# Patient Record
Sex: FEMALE | Race: BLACK OR AFRICAN AMERICAN | NOT HISPANIC OR LATINO | URBAN - METROPOLITAN AREA
[De-identification: names, ages, dates, MRNs, and addresses within clinical notes are randomized per-mention and may not be internally consistent; named-entity substitution may affect disease eponyms.]

---

## 2023-06-08 ENCOUNTER — EMERGENCY (EMERGENCY)
Facility: HOSPITAL | Age: 18
LOS: 1 days | Discharge: HOME | End: 2023-06-08
Attending: EMERGENCY MEDICINE | Admitting: EMERGENCY MEDICINE
Payer: MEDICAID

## 2023-06-08 VITALS
SYSTOLIC BLOOD PRESSURE: 111 MMHG | WEIGHT: 132.28 LBS | OXYGEN SATURATION: 99 % | RESPIRATION RATE: 16 BRPM | TEMPERATURE: 98 F | HEART RATE: 110 BPM | HEIGHT: 64 IN | DIASTOLIC BLOOD PRESSURE: 81 MMHG

## 2023-06-08 PROCEDURE — 73630 X-RAY EXAM OF FOOT: CPT | Mod: 26,RT

## 2023-06-08 PROCEDURE — 99284 EMERGENCY DEPT VISIT MOD MDM: CPT

## 2023-06-08 RX ORDER — IBUPROFEN 200 MG
1 TABLET ORAL
Qty: 20 | Refills: 0
Start: 2023-06-08

## 2023-06-08 RX ORDER — IBUPROFEN 200 MG
400 TABLET ORAL ONCE
Refills: 0 | Status: COMPLETED | OUTPATIENT
Start: 2023-06-08 | End: 2023-06-08

## 2023-06-08 RX ORDER — CEPHALEXIN 500 MG
1 CAPSULE ORAL
Qty: 28 | Refills: 0
Start: 2023-06-08 | End: 2023-06-14

## 2023-06-08 RX ADMIN — Medication 400 MILLIGRAM(S): at 18:01

## 2023-06-08 NOTE — ED PROVIDER NOTE - PATIENT PORTAL LINK FT
You can access the FollowMyHealth Patient Portal offered by [facility] by registering at the following website: [email or web address]. By joining [facility]’s FollowMyHealth portal, you will also be able to view your health information using other applications (apps) compatible with our system.

## 2023-06-08 NOTE — ED PROVIDER NOTE - CLINICAL SUMMARY MEDICAL DECISION MAKING FREE TEXT BOX
18 yo F with atraumatic foot pain and questionable very early cellulitis  Plan - xray, pain meds, dc with abx which pt will  if redness progresses/worsens

## 2023-06-08 NOTE — ED PROVIDER NOTE - OBJECTIVE STATEMENT
16 yo F denies pmh  c/o R lateral foot pain x 2 days, worse with palpation and movement, no treatment pta  Denies fever, trauma, other acute complaints

## 2023-06-08 NOTE — ED PROVIDER NOTE - PHYSICAL EXAMINATION
GENERAL: well appearing, no acute distress   HEAD: atraumatic   EYES: EOMI   ENT: moist oral mucosa   CARDIAC: regular rate  RESPIRATORY: no increased work of breathing   MUSCULOSKELETAL: base of R 5th metatarsal with ttp, mild swelling, small area overlying redness   NEUROLOGICAL: alert, spontaneous movement of extremities   SKIN: no visible rash  PSYCHIATRIC: cooperative

## 2023-06-08 NOTE — ED ADULT NURSE NOTE - NSFALLUNIVINTERV_ED_ALL_ED
Bed/Stretcher in lowest position, wheels locked, appropriate side rails in place/Call bell, personal items and telephone in reach/Instruct patient to call for assistance before getting out of bed/chair/stretcher/Non-slip footwear applied when patient is off stretcher/[place] to call system/Physically safe environment - no spills, clutter or unnecessary equipment/Purposeful proactive rounding/Room/bathroom lighting operational, light cord in reach

## 2023-06-10 DIAGNOSIS — M79.671 PAIN IN RIGHT FOOT: ICD-10-CM

## 2023-10-06 ENCOUNTER — EMERGENCY (EMERGENCY)
Facility: HOSPITAL | Age: 18
LOS: 1 days | Discharge: HOME | End: 2023-10-06
Attending: EMERGENCY MEDICINE | Admitting: EMERGENCY MEDICINE
Payer: COMMERCIAL

## 2023-10-06 VITALS
DIASTOLIC BLOOD PRESSURE: 67 MMHG | SYSTOLIC BLOOD PRESSURE: 101 MMHG | WEIGHT: 131.84 LBS | OXYGEN SATURATION: 97 % | HEART RATE: 100 BPM | HEIGHT: 64 IN | RESPIRATION RATE: 18 BRPM | TEMPERATURE: 99 F

## 2023-10-06 PROCEDURE — 99284 EMERGENCY DEPT VISIT MOD MDM: CPT

## 2023-10-06 RX ORDER — IBUPROFEN 200 MG
600 TABLET ORAL ONCE
Refills: 0 | Status: COMPLETED | OUTPATIENT
Start: 2023-10-06 | End: 2023-10-06

## 2023-10-06 RX ORDER — IBUPROFEN 200 MG
1 TABLET ORAL
Qty: 21 | Refills: 0
Start: 2023-10-06 | End: 2023-10-12

## 2023-10-06 RX ORDER — ACETAMINOPHEN 500 MG
650 TABLET ORAL ONCE
Refills: 0 | Status: COMPLETED | OUTPATIENT
Start: 2023-10-06 | End: 2023-10-06

## 2023-10-06 RX ORDER — ACETAMINOPHEN 500 MG
2 TABLET ORAL
Qty: 42 | Refills: 0
Start: 2023-10-06 | End: 2023-10-12

## 2023-10-06 RX ADMIN — Medication 650 MILLIGRAM(S): at 18:07

## 2023-10-06 RX ADMIN — Medication 600 MILLIGRAM(S): at 18:07

## 2023-10-06 NOTE — ED PROVIDER NOTE - PATIENT PORTAL LINK FT
You can access the FollowMyHealth Patient Portal offered by [facility] by registering at the following website: [email or web address]. By joining [facility]’s FollowMyHealth portal, you will also be able to view your health information using other applications (apps) compatible with our system.
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
## 2023-10-06 NOTE — ED PROVIDER NOTE - NSFOLLOWUPINSTRUCTIONS_ED_ALL_ED_FT
Take 2 500mg tylenol every 6-8 hours as needed for pain  Take 2-3 200mg Ibuprofen every 6-8 hours as needed for pain

## 2023-10-10 DIAGNOSIS — S70.12XA CONTUSION OF LEFT THIGH, INITIAL ENCOUNTER: ICD-10-CM
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
## 2023-10-10 DIAGNOSIS — Z91.013 ALLERGY TO SEAFOOD: ICD-10-CM
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
## 2023-10-10 DIAGNOSIS — M79.605 PAIN IN LEFT LEG: ICD-10-CM
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
## 2024-07-05 ENCOUNTER — EMERGENCY (EMERGENCY)
Facility: HOSPITAL | Age: 19
LOS: 1 days | Discharge: HOME | End: 2024-07-05
Attending: EMERGENCY MEDICINE | Admitting: EMERGENCY MEDICINE
Payer: MEDICAID

## 2024-07-05 VITALS
DIASTOLIC BLOOD PRESSURE: 71 MMHG | RESPIRATION RATE: 16 BRPM | WEIGHT: 134.92 LBS | TEMPERATURE: 98 F | OXYGEN SATURATION: 100 % | HEIGHT: 66 IN | HEART RATE: 90 BPM | SYSTOLIC BLOOD PRESSURE: 123 MMHG

## 2024-07-05 VITALS
DIASTOLIC BLOOD PRESSURE: 75 MMHG | HEART RATE: 86 BPM | OXYGEN SATURATION: 100 % | SYSTOLIC BLOOD PRESSURE: 116 MMHG | RESPIRATION RATE: 16 BRPM | TEMPERATURE: 98 F

## 2024-07-05 LAB — HCG UR QL: POSITIVE — SIGNIFICANT CHANGE UP

## 2024-07-05 PROCEDURE — 99284 EMERGENCY DEPT VISIT MOD MDM: CPT

## 2024-07-05 PROCEDURE — 73120 X-RAY EXAM OF HAND: CPT | Mod: 26,LT

## 2024-07-05 RX ORDER — NAPROXEN SODIUM 550 MG
1 TABLET ORAL
Qty: 14 | Refills: 0
Start: 2024-07-05 | End: 2024-07-11

## 2024-07-05 RX ORDER — AMOXICILLIN/POTASSIUM CLAV 250-125 MG
1 TABLET ORAL ONCE
Refills: 0 | Status: COMPLETED | OUTPATIENT
Start: 2024-07-05 | End: 2024-07-05

## 2024-07-05 RX ORDER — ACETAMINOPHEN 325 MG
2 TABLET ORAL
Qty: 50 | Refills: 0
Start: 2024-07-05 | End: 2024-07-11

## 2024-07-05 RX ORDER — TETANUS TOXOID, REDUCED DIPHTHERIA TOXOID AND ACELLULAR PERTUSSIS VACCINE, ADSORBED 5; 2.5; 8; 8; 2.5 [IU]/.5ML; [IU]/.5ML; UG/.5ML; UG/.5ML; UG/.5ML
0.5 SUSPENSION INTRAMUSCULAR ONCE
Refills: 0 | Status: COMPLETED | OUTPATIENT
Start: 2024-07-05 | End: 2024-07-05

## 2024-07-05 RX ORDER — BACITRACIN 500 UNIT/G
1 OINTMENT (GRAM) TOPICAL ONCE
Refills: 0 | Status: COMPLETED | OUTPATIENT
Start: 2024-07-05 | End: 2024-07-05

## 2024-07-05 RX ORDER — AMOXICILLIN/POTASSIUM CLAV 250-125 MG
875 TABLET ORAL
Qty: 14 | Refills: 0
Start: 2024-07-05 | End: 2024-07-11

## 2024-07-05 RX ADMIN — Medication 1 APPLICATION(S): at 19:35

## 2024-07-05 RX ADMIN — Medication 600 MILLIGRAM(S): at 18:43

## 2024-07-05 RX ADMIN — TETANUS TOXOID, REDUCED DIPHTHERIA TOXOID AND ACELLULAR PERTUSSIS VACCINE, ADSORBED 0.5 MILLILITER(S): 5; 2.5; 8; 8; 2.5 SUSPENSION INTRAMUSCULAR at 18:42

## 2024-07-05 RX ADMIN — Medication 1 TABLET(S): at 18:43

## 2024-07-06 PROBLEM — Z78.9 OTHER SPECIFIED HEALTH STATUS: Chronic | Status: ACTIVE | Noted: 2023-10-06

## 2024-07-09 DIAGNOSIS — S61.213A LACERATION WITHOUT FOREIGN BODY OF LEFT MIDDLE FINGER WITHOUT DAMAGE TO NAIL, INITIAL ENCOUNTER: ICD-10-CM

## 2024-07-09 DIAGNOSIS — Z91.013 ALLERGY TO SEAFOOD: ICD-10-CM

## 2024-07-09 DIAGNOSIS — Z23 ENCOUNTER FOR IMMUNIZATION: ICD-10-CM
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
## 2025-01-05 ENCOUNTER — OUTPATIENT (OUTPATIENT)
Dept: OUTPATIENT SERVICES | Facility: HOSPITAL | Age: 20
LOS: 1 days | End: 2025-01-05
Payer: COMMERCIAL

## 2025-01-05 ENCOUNTER — EMERGENCY (EMERGENCY)
Facility: HOSPITAL | Age: 20
LOS: 1 days | Discharge: ANOTHER HOSPITAL | End: 2025-01-05
Attending: EMERGENCY MEDICINE | Admitting: EMERGENCY MEDICINE
Payer: MEDICAID

## 2025-01-05 VITALS
TEMPERATURE: 98 F | RESPIRATION RATE: 17 BRPM | OXYGEN SATURATION: 100 % | HEART RATE: 91 BPM | DIASTOLIC BLOOD PRESSURE: 70 MMHG | SYSTOLIC BLOOD PRESSURE: 120 MMHG

## 2025-01-05 VITALS
SYSTOLIC BLOOD PRESSURE: 123 MMHG | TEMPERATURE: 98 F | OXYGEN SATURATION: 100 % | RESPIRATION RATE: 16 BRPM | DIASTOLIC BLOOD PRESSURE: 77 MMHG | HEART RATE: 96 BPM

## 2025-01-05 VITALS
SYSTOLIC BLOOD PRESSURE: 110 MMHG | DIASTOLIC BLOOD PRESSURE: 63 MMHG | OXYGEN SATURATION: 100 % | RESPIRATION RATE: 18 BRPM | TEMPERATURE: 98 F | HEART RATE: 120 BPM

## 2025-01-05 DIAGNOSIS — O20.9 HEMORRHAGE IN EARLY PREGNANCY, UNSPECIFIED: ICD-10-CM

## 2025-01-05 DIAGNOSIS — Z3A.32 32 WEEKS GESTATION OF PREGNANCY: ICD-10-CM

## 2025-01-05 DIAGNOSIS — O26.899 OTHER SPECIFIED PREGNANCY RELATED CONDITIONS, UNSPECIFIED TRIMESTER: ICD-10-CM

## 2025-01-05 LAB
ALBUMIN SERPL ELPH-MCNC: 3.1 G/DL — LOW (ref 3.4–5)
ALP SERPL-CCNC: 76 U/L — SIGNIFICANT CHANGE UP (ref 40–120)
ALT FLD-CCNC: 11 U/L — LOW (ref 12–42)
ANION GAP SERPL CALC-SCNC: 5 MMOL/L — LOW (ref 9–16)
APPEARANCE UR: CLEAR — SIGNIFICANT CHANGE UP
AST SERPL-CCNC: 16 U/L — SIGNIFICANT CHANGE UP (ref 15–37)
BASOPHILS # BLD AUTO: 0.01 K/UL — SIGNIFICANT CHANGE UP (ref 0–0.2)
BASOPHILS NFR BLD AUTO: 0.1 % — SIGNIFICANT CHANGE UP (ref 0–2)
BILIRUB SERPL-MCNC: 0.2 MG/DL — SIGNIFICANT CHANGE UP (ref 0.2–1.2)
BILIRUB UR-MCNC: NEGATIVE — SIGNIFICANT CHANGE UP
BUN SERPL-MCNC: 6 MG/DL — LOW (ref 7–23)
CALCIUM SERPL-MCNC: 8.6 MG/DL — SIGNIFICANT CHANGE UP (ref 8.5–10.5)
CHLORIDE SERPL-SCNC: 102 MMOL/L — SIGNIFICANT CHANGE UP (ref 96–108)
CO2 SERPL-SCNC: 29 MMOL/L — SIGNIFICANT CHANGE UP (ref 22–31)
COLOR SPEC: YELLOW — SIGNIFICANT CHANGE UP
CREAT SERPL-MCNC: 0.69 MG/DL — SIGNIFICANT CHANGE UP (ref 0.5–1.3)
DIFF PNL FLD: ABNORMAL
EGFR: 127 ML/MIN/1.73M2 — SIGNIFICANT CHANGE UP
EOSINOPHIL # BLD AUTO: 0.02 K/UL — SIGNIFICANT CHANGE UP (ref 0–0.5)
EOSINOPHIL NFR BLD AUTO: 0.3 % — SIGNIFICANT CHANGE UP (ref 0–6)
GLUCOSE SERPL-MCNC: 88 MG/DL — SIGNIFICANT CHANGE UP (ref 70–99)
GLUCOSE UR QL: NEGATIVE MG/DL — SIGNIFICANT CHANGE UP
HCG SERPL-ACNC: HIGH MIU/ML
HCT VFR BLD CALC: 28.6 % — LOW (ref 34.5–45)
HGB BLD-MCNC: 9.2 G/DL — LOW (ref 11.5–15.5)
IMM GRANULOCYTES NFR BLD AUTO: 0.4 % — SIGNIFICANT CHANGE UP (ref 0–0.9)
KETONES UR-MCNC: ABNORMAL MG/DL
LEUKOCYTE ESTERASE UR-ACNC: ABNORMAL
LYMPHOCYTES # BLD AUTO: 1.17 K/UL — SIGNIFICANT CHANGE UP (ref 1–3.3)
LYMPHOCYTES # BLD AUTO: 17.1 % — SIGNIFICANT CHANGE UP (ref 13–44)
MCHC RBC-ENTMCNC: 27.7 PG — SIGNIFICANT CHANGE UP (ref 27–34)
MCHC RBC-ENTMCNC: 32.2 G/DL — SIGNIFICANT CHANGE UP (ref 32–36)
MCV RBC AUTO: 86.1 FL — SIGNIFICANT CHANGE UP (ref 80–100)
MONOCYTES # BLD AUTO: 0.46 K/UL — SIGNIFICANT CHANGE UP (ref 0–0.9)
MONOCYTES NFR BLD AUTO: 6.7 % — SIGNIFICANT CHANGE UP (ref 2–14)
NEUTROPHILS # BLD AUTO: 5.15 K/UL — SIGNIFICANT CHANGE UP (ref 1.8–7.4)
NEUTROPHILS NFR BLD AUTO: 75.4 % — SIGNIFICANT CHANGE UP (ref 43–77)
NITRITE UR-MCNC: NEGATIVE — SIGNIFICANT CHANGE UP
NRBC # BLD: 0 /100 WBCS — SIGNIFICANT CHANGE UP (ref 0–0)
PH UR: 6 — SIGNIFICANT CHANGE UP (ref 5–8)
PLATELET # BLD AUTO: 356 K/UL — SIGNIFICANT CHANGE UP (ref 150–400)
POTASSIUM SERPL-MCNC: 3.7 MMOL/L — SIGNIFICANT CHANGE UP (ref 3.5–5.3)
POTASSIUM SERPL-SCNC: 3.7 MMOL/L — SIGNIFICANT CHANGE UP (ref 3.5–5.3)
PROT SERPL-MCNC: 7.4 G/DL — SIGNIFICANT CHANGE UP (ref 6.4–8.2)
PROT UR-MCNC: 30 MG/DL
RBC # BLD: 3.32 M/UL — LOW (ref 3.8–5.2)
RBC # FLD: 13.9 % — SIGNIFICANT CHANGE UP (ref 10.3–14.5)
SODIUM SERPL-SCNC: 136 MMOL/L — SIGNIFICANT CHANGE UP (ref 132–145)
SP GR SPEC: 1.02 — SIGNIFICANT CHANGE UP (ref 1–1.03)
UROBILINOGEN FLD QL: 1 MG/DL — SIGNIFICANT CHANGE UP (ref 0.2–1)
WBC # BLD: 6.84 K/UL — SIGNIFICANT CHANGE UP (ref 3.8–10.5)
WBC # FLD AUTO: 6.84 K/UL — SIGNIFICANT CHANGE UP (ref 3.8–10.5)

## 2025-01-05 PROCEDURE — 99285 EMERGENCY DEPT VISIT HI MDM: CPT

## 2025-01-05 RX ORDER — SODIUM CHLORIDE 9 MG/ML
1000 INJECTION, SOLUTION INTRAMUSCULAR; INTRAVENOUS; SUBCUTANEOUS ONCE
Refills: 0 | Status: COMPLETED | OUTPATIENT
Start: 2025-01-05 | End: 2025-01-05

## 2025-01-05 RX ADMIN — SODIUM CHLORIDE 1000 MILLILITER(S): 9 INJECTION, SOLUTION INTRAMUSCULAR; INTRAVENOUS; SUBCUTANEOUS at 20:40

## 2025-01-05 NOTE — ED ADULT TRIAGE NOTE - ESI TRIAGE ACUITY LEVEL, MLM
Problem: DISCHARGE PLANNING - CARE MANAGEMENT  Goal: Discharge to post-acute care or home with appropriate resources  INTERVENTIONS:  - Conduct assessment to determine patient/family and health care team treatment goals, and need for post-acute services based on payer coverage, community resources, and patient preferences, and barriers to discharge  - Address psychosocial, clinical, and financial barriers to discharge as identified in assessment in conjunction with the patient/family and health care team  - Arrange appropriate level of post-acute services according to patient's   needs and preference and payer coverage in collaboration with the physician and health care team  - Communicate with and update the patient/family, physician, and health care team regarding progress on the discharge plan  - Arrange appropriate transportation to post-acute venues   Outcome: Adequate for Discharge  Appropriate aftercare services in place  3 2 Yes

## 2025-01-05 NOTE — ED ADULT NURSE NOTE - CHIEF COMPLAINT QUOTE
pt c/o of vaginal bleeding x 1 day. pt is 32 weeks pregnant  no known complications. up to date on prenatal care. denies abd pain/cramps or other discomfort

## 2025-01-05 NOTE — ED PROVIDER NOTE - OBJECTIVE STATEMENT
20 yo F with no known PMHx, , LMP may 2024, currently 32 wk gestation with KAT of 3/5/25, OB prenatal care at [facility], last outpt US approximately 2 wks ago with +IUP, unknown blood type, presenting c/o vaginal bleeding x 1 day. Denies abdominal pain, vaginal d/c, gush of fluid, passage of clots/tissues, fever, chills, dysuria, hematuria, flank pain, CP, SOB, cough, palpitations, excessive N/V more than baseline, focal weakness, malaise, HA, dizziness, change in urinary/bowel pattern, and generalized weakness. Pt does not know her own blood type. 18 yo F with PMHx of GIDEON, no prior transfusion, , LMP may 2024, currently 32 wk gestation with KAT of 3/5/25, OB prenatal care at [facility], last outpt US approximately 2 wks ago with +IUP, unknown blood type, presenting c/o vaginal bleeding x 1 day. Denies abdominal pain, vaginal d/c, gush of fluid, passage of clots/tissues, fever, chills, dysuria, hematuria, flank pain, CP, SOB, cough, palpitations, excessive N/V more than baseline, focal weakness, malaise, HA, dizziness, change in urinary/bowel pattern, and generalized weakness. Pt does not know her own blood type. 20 yo F with PMHx of GIDEON, no prior transfusion, , LMP may 2024, currently 32 wk gestation with KAT of 3/5/25, OB prenatal care at [facility], last outpt US approximately 2 wks ago with +IUP, presenting c/o vaginal bleeding x 1 day. Reports using 2-3 pads today and +fetal movements. Denies abdominal pain, vaginal d/c, gush of fluid, passage of clots/tissues, trauma, fall, fever, chills, dysuria, hematuria, flank pain, CP, SOB, cough, palpitations, excessive N/V more than baseline, focal weakness, malaise, HA, dizziness, change in urinary/bowel pattern, and generalized weakness. Pt does not know her own blood type. Denies any sexual intercourse prior to bleeding

## 2025-01-05 NOTE — ED PROVIDER NOTE - CLINICAL SUMMARY MEDICAL DECISION MAKING FREE TEXT BOX
20 yo F with PMHx of GIDEON, no prior transfusion, , LMP may 2024, currently 32 wk gestation with KAT of 3/5/25, OB prenatal care at [facility], last outpt US approximately 2 wks ago with +IUP, presenting c/o vaginal bleeding x 1 day.  DDx including but not limited to the following -   placenta previa, threatened AB, infectious process,    plan - check U/A, labs (beta HCG, cbc, cmp, progesterone, type&screen), TAUS OB to evaluate for pelvic pathology and fetal wellbeing, IV hydration, symptom control and toco monitoring   - US not available at our facility at current time. Will need to transfer to [facility] for further OB evaluation   - case discussed with OB Attending  18 yo F with PMHx of GIDEON, no prior transfusion, , LMP may 2024, currently 32 wk gestation with KAT of 3/5/25, OB prenatal care at [facility], last outpt US approximately 2 wks ago with +IUP, presenting c/o vaginal bleeding x 1 day.  DDx including but not limited to the following -   placenta previa, threatened AB, infectious process, premature labor, ruptured vasa previa     plan - check U/A, labs (beta HCG, cbc, cmp, progesterone, type&screen), orthostatics, TAUS OB to evaluate for pelvic pathology and fetal wellbeing, IV hydration, symptom control and toco monitoring   - US not available at our facility at current time. Will need to transfer to [facility] for further OB evaluation   - case discussed with OB Attending Dr. Woods, accepted to [facility] L&D for further management and evaluation

## 2025-01-05 NOTE — ED PROVIDER NOTE - PHYSICAL EXAMINATION
Gen - WDWN F, NAD, comfortable and non-toxic appearing, speaking in full sentences  Skin - warm, dry, intact   HEENT - AT/NC, PERRL, EOMI, no nasal discharge, airway patent, neck supple and FROM, no JVD b/l   CV - S1S2, R/R/R  Resp - CTAB, no r/r/w  GI - soft, gravid, NT, no CVAT b/l  MS - No acute or gross deformities noted to extremities. No midline spinal tenderness or step off on palpation  Neuro - AxOx3, no focal neuro deficits, ambulatory without gait disturbance

## 2025-01-05 NOTE — OB RN TRIAGE NOTE - FALL HARM RISK - UNIVERSAL INTERVENTIONS
Bed in lowest position, wheels locked, appropriate side rails in place/Call bell, personal items and telephone in reach/Instruct patient to call for assistance before getting out of bed or chair/Non-slip footwear when patient is out of bed/[place] to call system/Physically safe environment - no spills, clutter or unnecessary equipment/Purposeful Proactive Rounding/Room/bathroom lighting operational, light cord in reach

## 2025-01-05 NOTE — ED ADULT TRIAGE NOTE - PATIENT ON (OXYGEN DELIVERY METHOD)
room air
Posture, length, shape and position symmetric and appropriate for age; movement patterns with normal strength and range of motion; hips without evidence of dislocation on Chou and Ortalani maneuvers and by gluteal fold patterns.

## 2025-01-05 NOTE — ED ADULT NURSE NOTE - OBJECTIVE STATEMENT
19y female presents to ED c/o vaginal bleeding. Pt is , currently 32 weeks pregnant, states gets prenatal care at [facility]. Pt states has been having intermittent vaginal bleeding x1 day with lower abdominal "heaviness". +Nausea without vomiting. Pt denies cp, sob, dizziness, weakness, diarrhea, fevers, chills.

## 2025-01-05 NOTE — ED ADULT TRIAGE NOTE - CHIEF COMPLAINT QUOTE
pt c/o of vaginal bleeding x 1 day. pt is 32 weeks pregnant  no known complications. up to date on prenatal care. pt c/o of vaginal bleeding x 1 day. pt is 32 weeks pregnant  no known complications. up to date on prenatal care. denies abd pain/cramps or other discomfort

## 2025-01-06 LAB
APPEARANCE UR: CLEAR — SIGNIFICANT CHANGE UP
BILIRUB UR-MCNC: NEGATIVE — SIGNIFICANT CHANGE UP
BLD GP AB SCN SERPL QL: NEGATIVE — SIGNIFICANT CHANGE UP
BLD GP AB SCN SERPL QL: NEGATIVE — SIGNIFICANT CHANGE UP
COLOR SPEC: YELLOW — SIGNIFICANT CHANGE UP
DIFF PNL FLD: NEGATIVE — SIGNIFICANT CHANGE UP
GLUCOSE UR QL: NEGATIVE MG/DL — SIGNIFICANT CHANGE UP
KETONES UR-MCNC: 80 MG/DL
LEUKOCYTE ESTERASE UR-ACNC: NEGATIVE — SIGNIFICANT CHANGE UP
NITRITE UR-MCNC: NEGATIVE — SIGNIFICANT CHANGE UP
PH UR: 8 — SIGNIFICANT CHANGE UP (ref 5–8)
PROGEST SERPL-MCNC: 91 NG/ML — SIGNIFICANT CHANGE UP
PROT UR-MCNC: NEGATIVE MG/DL — SIGNIFICANT CHANGE UP
RH IG SCN BLD-IMP: POSITIVE — SIGNIFICANT CHANGE UP
RH IG SCN BLD-IMP: POSITIVE — SIGNIFICANT CHANGE UP
SP GR SPEC: 1.01 — SIGNIFICANT CHANGE UP (ref 1–1.03)
UROBILINOGEN FLD QL: 0.2 MG/DL — SIGNIFICANT CHANGE UP (ref 0.2–1)

## 2025-01-06 PROCEDURE — 96361 HYDRATE IV INFUSION ADD-ON: CPT

## 2025-01-06 PROCEDURE — 87798 DETECT AGENT NOS DNA AMP: CPT

## 2025-01-06 PROCEDURE — 87591 N.GONORRHOEAE DNA AMP PROB: CPT

## 2025-01-06 PROCEDURE — 87661 TRICHOMONAS VAGINALIS AMPLIF: CPT

## 2025-01-06 PROCEDURE — 96374 THER/PROPH/DIAG INJ IV PUSH: CPT

## 2025-01-06 PROCEDURE — 87563 M. GENITALIUM AMP PROBE: CPT

## 2025-01-06 PROCEDURE — 99214 OFFICE O/P EST MOD 30 MIN: CPT

## 2025-01-06 PROCEDURE — 81003 URINALYSIS AUTO W/O SCOPE: CPT

## 2025-01-06 PROCEDURE — 87801 DETECT AGNT MULT DNA AMPLI: CPT

## 2025-01-06 RX ORDER — SODIUM CHLORIDE 9 MG/ML
1000 INJECTION, SOLUTION INTRAVENOUS ONCE
Refills: 0 | Status: COMPLETED | OUTPATIENT
Start: 2025-01-06 | End: 2025-01-06

## 2025-01-06 RX ORDER — ONDANSETRON 4 MG/1
8 TABLET ORAL ONCE
Refills: 0 | Status: COMPLETED | OUTPATIENT
Start: 2025-01-06 | End: 2025-01-06

## 2025-01-06 RX ADMIN — SODIUM CHLORIDE 1000 MILLILITER(S): 9 INJECTION, SOLUTION INTRAVENOUS at 01:20

## 2025-01-06 RX ADMIN — ONDANSETRON 8 MILLIGRAM(S): 4 TABLET ORAL at 02:19

## 2025-01-06 NOTE — OB PROVIDER TRIAGE NOTE - ATTENDING COMMENTS
20yo  at 31w5d presenting for vaginal bleeding and lower back pain that started this morning.    - SSE with no evidence of blood from the os.   - Irregular contractions on toco  - Cervix closed on VE. CL 2.0 cm on TVUS  - Fetal status is reassuring given BPP 8/8, appropriate INNA, reactive and reassuring NST  - IVF ordered  - UA and culture, vaginitis panel, ordered  - Continue to observe       Addendum 25 @03:00  Patient given IVF and zofran for nausea. UA results came back with no evidence of a UTI. Culture pending. [place] showing irregular contractions that patient does not feel. Patient reexamined and continues to be closed. CL 2.0. Tracing reactive and reassuring appropriate for gestational age. BPP 8/8 with appropriate INNA. Patient reports no additional bleeding or cramping. Blood type A positive, not requiring Rhogam. Reports lower back pain and pressure as subsiding. Discharged with strict return precautions to return if experiencing additional bleeding or spotting, or if she feels cramping/lower abdominal/pelvic/back pain or discomfort. Also discussed with patient the need to return if she experiences loss of fluids from the vagina or if she feels reduced fetal movement. Explained to patient that she will be contacted if any abnormalities are identified in her urine or vaginitis sample. Patient scheduled for an ultrasound on 25 at [facility] which she intends to go to. Patient expressed good understanding. All questions were answered. Patient provided with  discharge instructions.

## 2025-01-06 NOTE — OB PROVIDER TRIAGE NOTE - HISTORY OF PRESENT ILLNESS
Patient is a 18yo  at 31w5d presenting for vaginal bleeding and lower back pain that started this morning. Per patient she first noted the blood at 08:00 in the morning when she noticed a pink stain on the toilet paper. She continued to see the same intermittently throughout the day when using the restroom. Patient denies bright red blood or clots. she also described 4-5 discrete episodes of pelvic cramping that occurred during the day, lasting 1-2 minutes and subsiding spontaneously. Lastly, she describes ongoing lower back pain and pressure that started this morning and is worse when standing. Patient denies recent intercourse, falls or mechanical injuries. She Reports frequent urination the past few days, with no associated burning or dysuria. Per patient, she had some irritation in the groin and vulva areas for which she applied cortisone cream with complete resolution. She denies abnormal vaginal discharge. - LOF +FM    Ante: Spontaneous pregnancy. Patient unsure whether genetic testing was performed during pregnancy. Reports never having a formal ultrasound scan. Scheduled for one next week. Passed GCT. Normotensive in pregnancy. GBS unknown.    OBHX:  G1 - current   GynHX: denies fibroids, ovarian cysts, abnormal pap smear, STI/herpes.   MedHX: asthma (never hospitalized or intubated. Uses inhaler 1-2 times a year)  Surghx: denies  Medications: PNV  Allergies: banana (anaphylaxis) shrimp (anaphylaxis)    Physical Exam:  T(C): 36.7 (25 @ 23:30), Max: 36.8 (25 @ 20:08)  HR: 96 (25 @ 23:30) (91 - 120)  BP: 123/77 (25 @ 23:30) (110/63 - 123/77)  RR: 16 (25 @ 23:30) (16 - 18)  SpO2: 100% (25 @ 23:30) (100% - 100%)    General: NAD  Pulm: no increased WOB  Abdomen: soft, gravid, nontender  Extremities: wnl   TAUS: Cephalic. Placenta R lateral. INNA 11. BPP 8/8  TVUS: CL 2.0 cm  VE: closed  SSE: no bleeding noted along the vaginal walls, around the cervix or from the external os. Normal physiological discharge noted.    EFM: 140 bpm, mod variability, + accels, - decels; reactive and reassuring appropriate for gestational age  [place]: irregular contractions on toco, 1-2 in 10 min      A/p:  18yo  at 31w5d presenting for vaginal bleeding and lower back pain that started this morning.    - SSE with no evidence of blood from the os.   - Infrequent contractions on toco occuring 1-2 in 10 min  - Cervix closed on VE. CL 2.0 cm on TVUS  - Fetal status is reassuring given BPP 8/8, appropriate INNA, reactive and reassuring NST  - IVF ordered  - UA and culture, vaginitis panel, and placental abruptions labs ordered  - Continue to observe contraction pattern on toco    Discussed with Dr. Stephany May PGY3  Tanya Loyd PGY1       Patient is a 18yo  at 31w5d presenting for vaginal bleeding and lower back pain that started this morning. Per patient she first noted the blood at 08:00 in the morning when she noticed a pink stain on the toilet paper. She continued to see the same intermittently throughout the day when using the restroom. Patient denies bright red blood or clots. She also describes 4-5 discrete episodes of pelvic cramping that occurred during the day, lasting 1-2 minutes and subsiding spontaneously. Lastly, she describes ongoing lower back pain and pressure that started this morning and is worse when standing. Patient denies recent intercourse, falls or mechanical injuries. She does not have hemorrhoids. She Reports frequent urination the past few days, with no associated burning or dysuria. Per patient, she had some irritation in the groin and vulva areas for which she applied cortisone cream with complete resolution. She denies abnormal vaginal discharge. - LOF +FM    Ante: Spontaneous pregnancy. Patient unsure whether genetic testing was performed during pregnancy. Reports never having a formal ultrasound scan. Scheduled for one tomorrow. Passed GCT. Normotensive in pregnancy. GBS unknown.    OBHX:  G1 - current   GynHX: denies fibroids, ovarian cysts, abnormal pap smear, STI/herpes.   MedHX: asthma (never hospitalized or intubated. Uses inhaler 1-2 times a year)  Surghx: denies  Medications: PNV  Allergies: banana (anaphylaxis) shrimp (anaphylaxis)    Physical Exam:  T(C): 36.7 (25 @ 23:30), Max: 36.8 (25 @ 20:08)  HR: 96 (25 @ 23:30) (91 - 120)  BP: 123/77 (25 @ 23:30) (110/63 - 123/77)  RR: 16 (25 @ 23:30) (16 - 18)  SpO2: 100% (25 @ 23:30) (100% - 100%)    General: NAD  Pulm: no increased WOB  Abdomen: soft, gravid, nontender  Extremities: wnl   TAUS: Cephalic. Placenta R lateral. INNA 11. BPP 8/8  TVUS: CL 2.0 cm  VE: closed  SSE: no bleeding noted along the vaginal walls, around the cervix or from the external os. Normal physiological discharge noted.    EFM: 140 bpm, mod variability, + accels, - decels; reactive and reassuring appropriate for gestational age  [place]: irregular contractions on toco, 1-2 in 10 min      A/p:  18yo  at 31w5d presenting for vaginal bleeding and lower back pain that started this morning.    - SSE with no evidence of blood from the os.   - Irregular contractions on toco  - Cervix closed on VE. CL 2.0 cm on TVUS  - Fetal status is reassuring given BPP 8/8, appropriate INNA, reactive and reassuring NST  - IVF ordered  - UA and culture, vaginitis panel, ordered  - Continue to observe     Discussed with Dr. Stephany Purvis PGY3  Tanya Loyd PGY1    Addendum 25 @03:00  Patient given IVF and zofran for nausea. UA results came back with no evidence of a UTI. Culture pending. [place] showing irregular contractions that patient does not feel. Patient reexamined and continues to be closed. CL 2.0. Tracing reactive and reassuring appropriate for gestational age. BPP 8/8 with appropriate INNA. Patient reports no additional bleeding or cramping. Blood type A positive, not requiring Rhogam. Reports lower back pain and pressure as subsiding. Discharged with strict return precautions to return if experiencing additional bleeding or spotting, or if she feels cramping/lower abdominal/pelvic/back pain or discomfort. Also discussed with patient the need to return if she experiences loss of fluids from the vagina or if she feels reduced fetal movement. Explained to patient that she will be contacted if any abnormalities are identified in her urine or vaginitis sample. Patient scheduled for an ultrasound on 25 at [facility] which she intends to go to. Patient expressed good understanding. All questions were answered. Patient provided with  discharge instructions.      Discussed with Dr. Stephany Purvis and attending Dr. Peggy Loyd PGY1

## 2025-01-06 NOTE — CHART NOTE - NSCHARTNOTEFT_GEN_A_CORE
01-06-25 @ 02:20    To Whom It May Concern:    On behalf of Ms. RADHA LINK,    Please use this letter as verfication that the above patient was hospitalized at [facility] on the night of January 06.  If any questions or concerns please call [phone].       Best Regards,      Dr. Tanya Loyd MD  [facility]  OB/GYN Department   [address] [place] [address]  [phone] 01-06-25 @ 02:20    To Whom It May Concern:    On behalf of Ms. RADHA LINK,    Please use this letter as verfication that the above patient was hospitalized at [facility] on the night of January 6 and is recommended rest for the next 24 hours.   If any questions or concerns please call [phone].       Best Regards,      Dr. Tanya Loyd MD  [facility]  OB/GYN Department   [address] [place] [id number]  [phone]

## 2025-01-08 LAB
A VAGINAE DNA VAG QL NAA+PROBE: ABNORMAL
BVAB2 DNA VAG QL NAA+PROBE: ABNORMAL
C ALBICANS DNA VAG QL NAA+PROBE: POSITIVE
C GLABRATA DNA VAG QL NAA+PROBE: NEGATIVE — SIGNIFICANT CHANGE UP
M GENITALIUM DNA SPEC QL NAA+PROBE: NEGATIVE — SIGNIFICANT CHANGE UP
M HOMINIS DNA SPEC QL NAA+PROBE: POSITIVE
MEGA1 DNA VAG QL NAA+PROBE: ABNORMAL
T VAGINALIS RRNA SPEC QL NAA+PROBE: NEGATIVE — SIGNIFICANT CHANGE UP
UREAPLASMA DNA SPEC QL NAA+PROBE: POSITIVE

## 2025-01-08 RX ORDER — METRONIDAZOLE 7.5 MG/G
1 GEL TOPICAL
Qty: 1 | Refills: 0
Start: 2025-01-08 | End: 2025-01-12

## 2025-01-09 DIAGNOSIS — O99.513 DISEASES OF THE RESPIRATORY SYSTEM COMPLICATING PREGNANCY, THIRD TRIMESTER: ICD-10-CM

## 2025-01-09 DIAGNOSIS — R11.0 NAUSEA: ICD-10-CM

## 2025-01-09 DIAGNOSIS — Z3A.31 31 WEEKS GESTATION OF PREGNANCY: ICD-10-CM

## 2025-01-09 DIAGNOSIS — O26.893 OTHER SPECIFIED PREGNANCY RELATED CONDITIONS, THIRD TRIMESTER: ICD-10-CM

## 2025-01-09 DIAGNOSIS — O46.93 ANTEPARTUM HEMORRHAGE, UNSPECIFIED, THIRD TRIMESTER: ICD-10-CM

## 2025-01-09 DIAGNOSIS — J45.909 UNSPECIFIED ASTHMA, UNCOMPLICATED: ICD-10-CM

## 2025-01-09 DIAGNOSIS — O99.891 OTHER SPECIFIED DISEASES AND CONDITIONS COMPLICATING PREGNANCY: ICD-10-CM

## 2025-01-09 DIAGNOSIS — M54.50 LOW BACK PAIN, UNSPECIFIED: ICD-10-CM

## 2025-01-09 DIAGNOSIS — R35.0 FREQUENCY OF MICTURITION: ICD-10-CM
